# Patient Record
Sex: FEMALE | Race: AMERICAN INDIAN OR ALASKA NATIVE
[De-identification: names, ages, dates, MRNs, and addresses within clinical notes are randomized per-mention and may not be internally consistent; named-entity substitution may affect disease eponyms.]

---
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
## 2023-12-02 NOTE — EMERGENCY DEPARTMENT REPORT
ED Abdominal Pain HPI





- General


Chief Complaint: Abdominal Pain


Stated Complaint: ABDOMINAL PAIN


Time Seen by Provider: 10/21/18 04:55


Source: patient


Mode of arrival: Ambulatory


Limitations: No Limitations





- History of Present Illness


Initial Comments: 





18-year-old -American female comes to the emergency room complaining of 

abdominal pain with nausea 2 days.  She is reporting she felt mucus in her 

stool.  She reports that she has a history of H. pylori and did not complete 

her treatment as well as sees concern for a bump in her private.  Patient 

reports she is sexually active with men 3 in the last 6 months.  She reports 

she does not drink water.  Reports that she does not have a primary care 

provider here.  Patient reports that she's had a rotator cuff repair in the 

past.  She is not allergic to any medications currently takes no medications on 

a daily basis.


-: days(s) (2)


Location: suprapubic, bilateral flank


Severity scale (0 -10): 8


Quality: cramping


Consistency: intermittent


Improves With: nothing


Worsens With: nothing





- Related Data


 Previous Rx's











 Medication  Instructions  Recorded  Last Taken  Type


 


Nitrofurantoin Monohyd/M-Cryst 100 mg PO Q12H #20 capsule 10/21/18 Unknown Rx





[Macrobid 100 mg Capsule]    











 Allergies











Allergy/AdvReac Type Severity Reaction Status Date / Time


 


No Known Allergies Allergy   Unverified 10/21/18 02:03














ED Review of Systems


ROS: 


Stated complaint: ABDOMINAL PAIN


Other details as noted in HPI








ED Past Medical Hx





- Past Medical History


Additional medical history: H.pylori





- Surgical History


Past Surgical History?: Yes


Additional Surgical History: Right rotator cuff repair





- Social History


Smoking Status: Current Every Day Smoker


Substance Use Type: None, Marijuana





- Medications


Home Medications: 


 Home Medications











 Medication  Instructions  Recorded  Confirmed  Last Taken  Type


 


Nitrofurantoin Monohyd/M-Cryst 100 mg PO Q12H #20 capsule 10/21/18  Unknown Rx





[Macrobid 100 mg Capsule]     














ED Physical Exam





- General


Limitations: No Limitations


General appearance: alert, in no apparent distress





- Head


Head exam: Present: atraumatic, normocephalic





- Eye


Eye exam: Present: normal appearance





- ENT


ENT exam: Present: mucous membranes moist





- Neck


Neck exam: Present: normal inspection





- Respiratory


Respiratory exam: Present: normal lung sounds bilaterally.  Absent: respiratory 

distress





- Cardiovascular


Cardiovascular Exam: Present: regular rate, normal rhythm.  Absent: systolic 

murmur, diastolic murmur, rubs, gallop





- GI/Abdominal


GI/Abdominal exam: Present: soft, normal bowel sounds





- Extremities Exam


Extremities exam: Present: normal inspection





- Back Exam


Back exam: Present: normal inspection





- Neurological Exam


Neurological exam: Present: alert, oriented X3





- Psychiatric


Psychiatric exam: Present: normal affect, normal mood





- Skin


Skin exam: Present: warm, dry, intact, normal color.  Absent: rash





ED Course


 Vital Signs











  10/21/18





  01:46


 


Temperature 98.6 F


 


Pulse Rate 80


 


Respiratory 18





Rate 


 


Blood Pressure 122/68


 


O2 Sat by Pulse 100





Oximetry 














ED Medical Decision Making





- Lab Data


Result diagrams: 


 10/21/18 02:14





 10/21/18 02:14


Critical care attestation.: 


If time is entered above; I have spent that time in minutes in the direct care 

of this critically ill patient, excluding procedure time.








ED Disposition


Clinical Impression: 


UTI (urinary tract infection)


Qualifiers:


 Urinary tract infection type: site unspecified Hematuria presence: without 

hematuria Qualified Code(s): N39.0 - Urinary tract infection, site not specified





Disposition: DC-01 TO HOME OR SELFCARE


Is pt being admited?: No


Does the pt Need Aspirin: No


Condition: Stable


Instructions:  Urinary Tract Infection in Women (ED), Abdominal Pain (ED)


Additional Instructions: 


Please antibiotics as prescribed.  Increase her water intake by 2 L a day.  

Follow-up with the health department for STD duration.  Please use condoms to 

prevent STDs.  


Prescriptions: 


Nitrofurantoin Monohyd/M-Cryst [Macrobid 100 mg Capsule] 100 mg PO Q12H #20 

capsule


Referrals: 


[facility] [Primary Care Provider] - 3-5 Days


[facility] [Provider Group] - 3-5 Days <-- Click to add NO significant Past Surgical History